# Patient Record
Sex: MALE | Race: NATIVE HAWAIIAN OR OTHER PACIFIC ISLANDER
[De-identification: names, ages, dates, MRNs, and addresses within clinical notes are randomized per-mention and may not be internally consistent; named-entity substitution may affect disease eponyms.]

---

## 2019-03-25 ENCOUNTER — HOSPITAL ENCOUNTER (EMERGENCY)
Dept: HOSPITAL 42 - ED | Age: 49
Discharge: HOME | End: 2019-03-25
Payer: COMMERCIAL

## 2019-03-25 VITALS
HEART RATE: 59 BPM | RESPIRATION RATE: 16 BRPM | OXYGEN SATURATION: 96 % | DIASTOLIC BLOOD PRESSURE: 79 MMHG | SYSTOLIC BLOOD PRESSURE: 123 MMHG

## 2019-03-25 VITALS — TEMPERATURE: 98 F

## 2019-03-25 DIAGNOSIS — M25.561: Primary | ICD-10-CM

## 2019-03-25 NOTE — ED PDOC
Arrival/HPI





- General


Chief Complaint: Lower Extremity Problem/Injury


Time Seen by Provider: 03/25/19 19:43


Historian: Patient





- History of Present Illness


Narrative History of Present Illness (Text): 





03/25/19 19:48


48 year old male, with no significant past medical history, presents to the ED 

for evaluation of right knee pain since prior to arrival. Patient reports 

chronic pain to the area with intermittent right knee "popping in and out", 

which worsened today. Patient denies any other associated somatic complaints. 

Patient denies any numbness/weakness, difficulty ambulating, leg swelling or any

other complaints. 





Time/Duration: Prior to Arrival


Symptom Onset: Gradual


Symptom Course: Unchanged


Activities at Onset: Light


Context: Home





Past Medical History





- Provider Review


Nursing Documentation Reviewed: Yes





- Infectious Disease


Hx of Infectious Diseases: None





- Cardiac


Hx Cardiac Disorders: No


Hx Angina: No





- Pulmonary


Hx Respiratory Disorders: No





- Psychiatric


Hx Substance Use: No





- Anesthesia


Hx Anesthesia: No





Family/Social History





- Physician Review


Nursing Documentation Reviewed: Yes


Family/Social History: Unknown Family HX


Smoking Status: Never Smoked


Hx Alcohol Use: No


Hx Substance Use: No





Allergies/Home Meds


Allergies/Adverse Reactions: 


Allergies





No Known Allergies Allergy (Verified 03/25/19 19:41)


   








Home Medications: 


                                    Home Meds











 Medication  Instructions  Recorded  Confirmed


 


No Known Home Med  03/25/19 03/25/19














Review of Systems





- Physician Review


All systems were reviewed & negative as marked: Yes





- Review of Systems


Musculoskeletal: Arthralgias (Right knee pain).  absent: Back Pain


Neurological: absent: Focal Weakness





Physical Exam





- Physical Exam


Narrative Physical Exam (Text): 





03/25/19 19:51


Constitutional: No acute distress.


Head: Normocephalic.  Atraumatic.  


Eyes:  PERRL.


ENT:  Moist mucous membranes.


Neck:  Supple.


Cardiovascular:  Regular rate.


Chest: No tenderness.


Respiratory:  Clear to auscultation bilaterally.


GI:  Soft. Nontender. Nondistended. 


Back:  No CVA tenderness.


Musculoskeletal:  No tenderness or swelling of extremities. DP pulses 2+. Able 

to actively flex and extend right knee just short of 180 degrees.


Skin:  No rash. 


Neurologic:  Alert, no focal deficit. 





Vital Signs Reviewed: Yes





Vital Signs











  Temp Pulse Resp BP Pulse Ox


 


 03/25/19 19:43  98.0 F  68  17  145/83  97











Temperature: Afebrile


Blood Pressure: Normal


Pulse: Regular


Respiratory Rate: Normal


Appearance: Positive for: Well-Appearing, Non-Toxic, Comfortable


Pain Distress: None


Mental Status: Positive for: Alert and Oriented X 3





Medical Decision Making


ED Course and Treatment: 





03/25/19 19:52


Impression: 


48 year old male presents to the ED for evaluation of right knee pain. 





Plan:


-- X-ray of right knee


-- Reassess and disposition





Prior Visits:


Notes and results from previous visits were reviewed. 





Progress Notes:


XR shows no fracture or dislocation. Knee immobilizer and crutches provided with

education on their use. Instructed to f/u with Ortho.








- Scribe Statement


The provider has reviewed the documentation as recorded by the Scribe


Patito Austin





All medical record entries made by the Scribe were at my direction and 

personally dictated by me. I have reviewed the chart and agree that the record 

accurately reflects my personal performance of the history, physical exam, 

medical decision making, and the department course for this patient. I have also

personally directed, reviewed, and agree with the discharge instructions and 

disposition. 











Disposition/Present on Arrival





- Present on Arrival


Any Indicators Present on Arrival: No


History of DVT/PE: No


History of Uncontrolled Diabetes: No


Urinary Catheter: No


History of Decub. Ulcer: No


History Surgical Site Infection Following: None





- Disposition


Have Diagnosis and Disposition been Completed?: Yes


Diagnosis: 


 Knee pain





Disposition: HOME/ ROUTINE


Disposition Time: 21:07


Patient Plan: Discharge


Condition: GOOD


Discharge Instructions (ExitCare):  Knee Pain


Referrals: 


PCP,NO [Primary Care Provider] - Follow up with primary


Jacinto Mancuso III, MD [Medical Doctor] - Follow up with primary


Forms:  [employer] (English), WORK NOTE

## 2019-03-26 NOTE — RAD
Date of service: 



03/25/2019



PROCEDURE:  Right Knee Radiographs.



HISTORY:

knee pain



COMPARISON:

None.



TECHNIQUE:

Three views obtained.



FINDINGS:



BONES:

Normal. No fracture. 



JOINTS:

Normal. No osteoarthritis. 



JOINT EFFUSION:

None. 



OTHER FINDINGS:

None.



IMPRESSION:

Normal radiographs of the right knee.